# Patient Record
Sex: FEMALE | Race: WHITE | NOT HISPANIC OR LATINO | Employment: STUDENT | URBAN - METROPOLITAN AREA
[De-identification: names, ages, dates, MRNs, and addresses within clinical notes are randomized per-mention and may not be internally consistent; named-entity substitution may affect disease eponyms.]

---
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
## 2024-09-26 ENCOUNTER — PATIENT MESSAGE (OUTPATIENT)
Dept: PSYCHIATRY | Facility: CLINIC | Age: 24
End: 2024-09-26

## 2024-12-17 ENCOUNTER — TELEPHONE (OUTPATIENT)
Dept: PSYCHIATRY | Facility: CLINIC | Age: 24
End: 2024-12-17

## 2024-12-17 NOTE — TELEPHONE ENCOUNTER
"Pt said she's out of town and will not attend new pt appt with HOLLY Yeager today. Stated she really doesn't need the appt and will call if she needs an appt. Informed pt she only has one more opportunity to schedule if another no show/ late cancel occurs,she will be removed for wait list. Patient verbalized understanding by replying "okay".  "

## 2025-02-20 ENCOUNTER — TELEPHONE (OUTPATIENT)
Dept: PSYCHIATRY | Facility: CLINIC | Age: 25
End: 2025-02-20
Payer: COMMERCIAL

## 2025-02-20 NOTE — TELEPHONE ENCOUNTER
Patient called asking to see about scheduling a new patient appointment with Artie Gallardo. She had an appointment originally but it was canceled. I told patient as soon as we have a spot open we would give her a call back.    [id number]